# Patient Record
Sex: FEMALE | Race: WHITE | NOT HISPANIC OR LATINO | Employment: FULL TIME | URBAN - METROPOLITAN AREA
[De-identification: names, ages, dates, MRNs, and addresses within clinical notes are randomized per-mention and may not be internally consistent; named-entity substitution may affect disease eponyms.]

---

## 2019-12-18 ENCOUNTER — TRANSFERRED RECORDS (OUTPATIENT)
Dept: HEALTH INFORMATION MANAGEMENT | Facility: CLINIC | Age: 34
End: 2019-12-18

## 2019-12-18 LAB — PAP-ABSTRACT: NORMAL

## 2020-03-25 ENCOUNTER — VIRTUAL VISIT (OUTPATIENT)
Dept: OBGYN | Facility: CLINIC | Age: 35
End: 2020-03-25
Payer: COMMERCIAL

## 2020-03-25 DIAGNOSIS — N94.6 DYSMENORRHEA: Primary | ICD-10-CM

## 2020-03-25 DIAGNOSIS — N94.10 DYSPAREUNIA, FEMALE: ICD-10-CM

## 2020-03-25 PROCEDURE — 99202 OFFICE O/P NEW SF 15 MIN: CPT | Mod: TEL | Performed by: OBSTETRICS & GYNECOLOGY

## 2020-03-25 RX ORDER — COVID-19 ANTIGEN TEST
220 KIT MISCELLANEOUS 2 TIMES DAILY
COMMUNITY
End: 2020-05-21

## 2020-03-25 RX ORDER — TOPIRAMATE 25 MG/1
25 TABLET, FILM COATED ORAL 2 TIMES DAILY
COMMUNITY
Start: 2020-03-06 | End: 2020-04-01

## 2020-03-25 RX ORDER — CHOLECALCIFEROL (VITAMIN D3) 1250 MCG
50000 CAPSULE ORAL PRN
COMMUNITY
Start: 2020-02-26

## 2020-03-25 RX ORDER — PHENTERMINE HYDROCHLORIDE 37.5 MG/1
37.5 TABLET ORAL DAILY
COMMUNITY
Start: 2020-03-06 | End: 2020-05-21

## 2020-03-25 ASSESSMENT — PATIENT HEALTH QUESTIONNAIRE - PHQ9
SUM OF ALL RESPONSES TO PHQ QUESTIONS 1-9: 3
5. POOR APPETITE OR OVEREATING: NOT AT ALL

## 2020-03-25 ASSESSMENT — ANXIETY QUESTIONNAIRES
5. BEING SO RESTLESS THAT IT IS HARD TO SIT STILL: NOT AT ALL
7. FEELING AFRAID AS IF SOMETHING AWFUL MIGHT HAPPEN: NOT AT ALL
6. BECOMING EASILY ANNOYED OR IRRITABLE: NOT AT ALL
1. FEELING NERVOUS, ANXIOUS, OR ON EDGE: SEVERAL DAYS
GAD7 TOTAL SCORE: 2
2. NOT BEING ABLE TO STOP OR CONTROL WORRYING: SEVERAL DAYS
3. WORRYING TOO MUCH ABOUT DIFFERENT THINGS: NOT AT ALL
IF YOU CHECKED OFF ANY PROBLEMS ON THIS QUESTIONNAIRE, HOW DIFFICULT HAVE THESE PROBLEMS MADE IT FOR YOU TO DO YOUR WORK, TAKE CARE OF THINGS AT HOME, OR GET ALONG WITH OTHER PEOPLE: NOT DIFFICULT AT ALL

## 2020-03-25 NOTE — PROGRESS NOTES
"Marifer Jenkins is a 35 year old female who is being evaluated via a billable telephone visit.      The patient has been notified of following:     \"This telephone visit will be conducted via a call between you and your physician/provider. We have found that certain health care needs can be provided without the need for a physical exam.  This service lets us provide the care you need with a short phone conversation.  If a prescription is necessary we can send it directly to your pharmacy.  If lab work is needed we can place an order for that and you can then stop by our lab to have the test done at a later time.    If during the course of the call the physician/provider feels a telephone visit is not appropriate, you will not be charged for this service.\"     Marifer Jenkins complains of    Chief Complaint   Patient presents with     Endometriosi     want to family plan and endo has gotten in the way       I have reviewed and updated the patient's Past Medical History, Social History, Family History and Medication List.    ALLERGIES  Dilaudid [hydromorphone] and Xray dye [contrast dye]    Additional provider notes: The patient is a 35-year-old  1 para 0-0-1-0 who has a progressive history of dysmenorrhea and dyspareunia that is now leading to almost constant pain throughout the cycle.  She has not had an ultrasound or any surgical intervention for diagnosis or treatment of possible endometriosis.  Her primary OB has chosen to put her on a weight loss program of phentermine and Topamax and an IUD (Mirena) was placed.  She is furloughed off of work due to the pandemic at this time.  She is very concerned about the marked increase in pain but understands that the Mirena should stabilize this and hopefully knock down her bleeding to nothing at this time.  Her overall medical history is complicated by weight at the beginning of her program up to 57.  She has lost 10 pounds already.  Her obstetrical history shows " what she feels were a few chemical pregnancies but one true miscarriage after seeing heart tones 2 years ago.  They have not been actively trying recently.  We went through a long discussion of the etiology and possibilities of endometriosis.  She deserves a ultrasound and full examination.  Ultimately she will need a laparoscopy with CO2 laser to make the definitive diagnosis and treatment.  Patient understands the risk and complications of this.  I am not against her staying on her Mirena IUD and have encouraged her to take this time that we have to lose as much weight as possible as it will improve her overall health and the health of her pregnancy.  The patient is very grateful for this discussion that lasted over 20 minutes.    Assessment/Plan: History of progressive dysmenorrhea and dyspareunia.  Rule out endometriosis.  History of obesity with ongoing weight loss program at this time    Phone call duration: 22 minutes    Luis M Nguyen MD

## 2020-03-26 ASSESSMENT — ANXIETY QUESTIONNAIRES: GAD7 TOTAL SCORE: 2

## 2020-04-01 ENCOUNTER — TELEPHONE (OUTPATIENT)
Dept: OBGYN | Facility: CLINIC | Age: 35
End: 2020-04-01

## 2020-04-01 ENCOUNTER — ANCILLARY PROCEDURE (OUTPATIENT)
Dept: ULTRASOUND IMAGING | Facility: CLINIC | Age: 35
End: 2020-04-01
Attending: OBSTETRICS & GYNECOLOGY
Payer: COMMERCIAL

## 2020-04-01 ENCOUNTER — OFFICE VISIT (OUTPATIENT)
Dept: OBGYN | Facility: CLINIC | Age: 35
End: 2020-04-01
Attending: OBSTETRICS & GYNECOLOGY
Payer: COMMERCIAL

## 2020-04-01 ENCOUNTER — PREP FOR PROCEDURE (OUTPATIENT)
Dept: OBGYN | Facility: CLINIC | Age: 35
End: 2020-04-01

## 2020-04-01 VITALS
WEIGHT: 251 LBS | HEIGHT: 64 IN | SYSTOLIC BLOOD PRESSURE: 132 MMHG | BODY MASS INDEX: 42.85 KG/M2 | HEART RATE: 76 BPM | DIASTOLIC BLOOD PRESSURE: 84 MMHG

## 2020-04-01 DIAGNOSIS — N94.6 DYSMENORRHEA: Primary | ICD-10-CM

## 2020-04-01 DIAGNOSIS — N94.6 DYSMENORRHEA: ICD-10-CM

## 2020-04-01 DIAGNOSIS — E66.01 MORBID OBESITY (H): ICD-10-CM

## 2020-04-01 PROCEDURE — 99214 OFFICE O/P EST MOD 30 MIN: CPT | Performed by: OBSTETRICS & GYNECOLOGY

## 2020-04-01 PROCEDURE — 76830 TRANSVAGINAL US NON-OB: CPT | Performed by: OBSTETRICS & GYNECOLOGY

## 2020-04-01 ASSESSMENT — MIFFLIN-ST. JEOR: SCORE: 1823.53

## 2020-04-01 NOTE — Clinical Note
Please abstract the following data from this visit with this patient into the appropriate field in Epic:        Other Tests found in the patient's chart through Chart Review/Care Everywhere:    Pap smear done by this group [facility] this date: 12/18/19    Note to Abstraction: If this section is blank, no results were found via Chart Review/Care Everywhere.

## 2020-04-01 NOTE — PROGRESS NOTES
SUBJECTIVE:                                                   Marifer Jenkins is a 35 year old female who presents to clinic today for the following health issue(s):  Patient presents with:  Ultrasound: pelvic pain        HPI: The patient is seen at this time for ongoing evaluation of severe dysmenorrhea and dyspareunia.  There is been a question of possible endometriosis but she has not had any laparoscopic confirmation.  Ultrasound is performed today.  The patient does have a Mirena IUD in place and she is in the middle of a weight loss program at this time.  Her goal is to lose at least 50 pounds.  The patient has less pain now that she has suppressed but is still debilitated by this.    Patient's last menstrual period was 2020 (exact date)..     Patient is sexually active, .  Using IUD for contraception.    reports that she has never smoked. She has never used smokeless tobacco.    STD testing offered?  Declined    Health maintenance updated:  yes    Today's PHQ-2 Score:   PHQ-2 (  [employer]) 2020   Q1: Little interest or pleasure in doing things 0   Q2: Feeling down, depressed or hopeless 0   PHQ-2 Score 0     Today's PHQ-9 Score:   PHQ-9 SCORE 3/25/2020   PHQ-9 Total Score 3     Today's SHYANNE-7 Score:   SHYANNE-7 SCORE 3/25/2020   Total Score 2       Problem list and histories reviewed & adjusted, as indicated.  Additional history: as documented.    Patient Active Problem List   Diagnosis     Obesity due to excess calories     Past Surgical History:   Procedure Laterality Date     EXC SKIN BENIG >4CM FACE,FACIAL  2011    benign mass from under chin removed     GALLBLADDER SURGERY  10/7/14      Social History     Tobacco Use     Smoking status: Never Smoker     Smokeless tobacco: Never Used   Substance Use Topics     Alcohol use: Yes     Alcohol/week: 0.0 standard drinks     Comment: not while pregnant      Problem (# of Occurrences) Relation (Name,Age of Onset)    Alzheimer Disease (1)  "Maternal Grandfather    Arthritis (1) Maternal Grandmother    Cerebrovascular Disease (1) Maternal Grandfather    Diabetes (1) Paternal Grandmother    Heart Failure (1) Maternal Grandfather    Hyperlipidemia (6) Maternal Grandfather, Paternal Grandmother, Mother, Father, Maternal Grandmother, Paternal Grandfather    No Known Problems (3) Sister, Brother, Other       Negative family history of: Glaucoma, Macular Degeneration            Current Outpatient Medications   Medication Sig     cholecalciferol (VITAMIN D3) 24634 units (1250 mcg) capsule Take 50,000 capsules by mouth as needed Twice a week     levonorgestrel (MIRENA, 52 MG,) 20 MCG/24HR IUD 1 each by Intrauterine route once     naproxen sodium (ALEVE) 220 MG capsule Take 220 mg by mouth 2 times daily prn     phentermine (ADIPEX-P) 37.5 MG tablet Take 37.5 mg by mouth daily     No current facility-administered medications for this visit.      Allergies   Allergen Reactions     Dilaudid [Hydromorphone] Nausea and Vomiting     Xray Dye [Contrast Dye]        ROS:  12 point review of systems negative other than symptoms noted below or in the HPI.  Genitourinary: Pelvic Pain  No urinary frequency or dysuria, bladder or kidney problems      OBJECTIVE:     /84   Pulse 76   Ht 1.626 m (5' 4\")   Wt 113.9 kg (251 lb)   LMP 03/05/2020 (Exact Date)   BMI 43.08 kg/m    Body mass index is 43.08 kg/m .    Exam:  Constitutional:  Appearance: Well nourished, well developed alert, in no acute distress  Gastrointestinal:  Abdominal Examination:  Abdomen nontender to palpation, tone normal without rigidity or guarding, no masses present, umbilicus without lesions; Liver/Spleen:  No hepatomegaly present, liver nontender to palpation; Hernias:  No hernias present  Lymphatic: Lymph Nodes:  No other lymphadenopathy present  Skin: General Inspection:  No rashes present, no lesions present, no areas of discoloration.  Neurologic:  Mental Status:  Oriented X3.  Normal " strength and tone, sensory exam grossly normal, mentation intact and speech normal.    Psychiatric:  Mentation appears normal and affect normal/bright.  Pelvic Exam:  External Genitalia:     Normal appearance for age, no discharge present, no tenderness present, no inflammatory lesions present, color normal  Vagina:     Normal vaginal vault without central or paravaginal defects, no discharge present, no inflammatory lesions present, no masses present  Bladder:     Nontender to palpation  Urethra:   Urethral Body:  Urethra palpation normal, urethra structural support normal   Urethral Meatus:  No erythema or lesions present  Cervix:     Appearance healthy, no lesions present, nontender to palpation, no bleeding present  Uterus:     Uterus: firm, normal sized and tender high on left, midplane in position.   Adnexa:     Left adnexal tenderness present, no adnexal masses present  Perineum:     Perineum within normal limits, no evidence of trauma, no rashes or skin lesions present  Anus:     Anus within normal limits, no hemorrhoids present  Inguinal Lymph Nodes:     No lymphadenopathy present  Pubic Hair:     Normal pubic hair distribution for age  Genitalia and Groin:     No rashes present, no lesions present, no areas of discoloration, no masses present       In-Clinic Test Results:  Results for orders placed or performed in visit on 04/01/20   US Pelvic Complete w Transvaginal     Status: None    Narrative    US Pelvic Complete w Transvaginal   Order #: [id number] Accession #: [id number]   Study Notes?      Lizzette Villela on 4/1/2020 ? 1:34 PM    ?   Gynecological Ultrasound Report  Pelvic U/S - Transvaginal  [facility]  Referring Provider: Luis M Nguyen MD  Sonographer:  Lizzette Villela RDMS  Indication: Pain- Dysmenorrhea  LMP: Patient's last menstrual period was 03/05/2020 (exact date).  History:   Gynecological Ultrasonography:   Uterus: anteverted and mid-position. Contour is  smooth/regular.  Size: 8.78 x 4.62 x 3.66 cm  Endometrium: Thickness Total 6.57 mm  Findings: IUD normal placement  Right Ovary: 3.84 x 2.29 x 2.47 cm. Wnl  Left Ovary: 2.57 x 2.87 x 1.80 cm. Wnl  Cul de Sac Free Fluid: No free fluid  ?   Impression:   ?   SONOGRAPHER NOTES TO READING PROVIDER:   No free fluid- no adhesions   visualized- iud normal placement  ?   ?            ASSESSMENT/PLAN:                                                        35-year-old patient with progressive history of dysmenorrhea and dyspareunia.  This is debilitating.  She has a relatively normal-looking ultrasound with a normal placement of her IUD.  Her ovaries are normal size with no cystic enlargement.  There is no free fluid.  There are no fibroids noted.  Examination shows a small firm uterus that is nontender.  She does have tenderness high on the left fornix and adnexa.  The right side is negative.  We have discussed the possibility of definitive diagnosis to rule out and treat endometriosis.  We gave her an ACOG brochure for laparoscopy.  The risks and complications of the procedure have been reviewed and accepted.  This would necessitate a short general anesthetic and would allow us to discern the etiology of her ongoing pelvic pain and dysmenorrhea.  The patient is trying very hard at this time to lose weight and is on a pharmacologic protocol.      Luis M Nguyen MD  [facility]

## 2020-04-30 PROBLEM — N94.6 DYSMENORRHEA: Status: ACTIVE | Noted: 2020-04-30

## 2020-05-01 NOTE — TELEPHONE ENCOUNTER
Type of surgery: LSC C02 LASER  Location of surgery: [facility] OR  Date and time of surgery: 5/7/2020 11:35a  Surgeon: ROGER  Pre-Op Appt Date: HOSPITAL  Post-Op Appt Date: TBD   Packet sent out: MAILED 4/30/2020  Pre-cert/Authorization completed:  TBD  Date: 4/30/2020 Abel jaimes/Kathie Mayes  Surgery Scheduler

## 2020-05-03 DIAGNOSIS — Z20.822 ENCOUNTER FOR LABORATORY TESTING FOR COVID-19 VIRUS: Primary | ICD-10-CM

## 2020-05-06 ENCOUNTER — OFFICE VISIT (OUTPATIENT)
Dept: URGENT CARE | Facility: URGENT CARE | Age: 35
End: 2020-05-06
Attending: OBSTETRICS & GYNECOLOGY
Payer: COMMERCIAL

## 2020-05-06 DIAGNOSIS — Z20.822 ENCOUNTER FOR LABORATORY TESTING FOR COVID-19 VIRUS: ICD-10-CM

## 2020-05-06 PROCEDURE — 99000 SPECIMEN HANDLING OFFICE-LAB: CPT

## 2020-05-06 PROCEDURE — U0003 INFECTIOUS AGENT DETECTION BY NUCLEIC ACID (DNA OR RNA); SEVERE ACUTE RESPIRATORY SYNDROME CORONAVIRUS 2 (SARS-COV-2) (CORONAVIRUS DISEASE [COVID-19]), AMPLIFIED PROBE TECHNIQUE, MAKING USE OF HIGH THROUGHPUT TECHNOLOGIES AS DESCRIBED BY CMS-2020-01-R: HCPCS | Mod: 90

## 2020-05-06 RX ORDER — PHENAZOPYRIDINE HYDROCHLORIDE 200 MG/1
200 TABLET, FILM COATED ORAL ONCE
Status: CANCELLED | OUTPATIENT
Start: 2020-05-06 | End: 2020-05-06

## 2020-05-06 NOTE — TELEPHONE ENCOUNTER
Patient calling for packet details as she never rec'd packet. Gave her important info, as well as codes for insurance. Notified her she will get a pre-op nurse calling as well. She is scheduled for her covid testing today at 2:10 which was first available when she was called on Monday.

## 2020-05-06 NOTE — H&P
Other Tests found in the patient's chart through Chart Review/Care Everywhere:     Pap smear done by this group Margie this date: 19     Note to Abstraction: If this section is blank, no results were found via Chart Review/Care Everywhere.               Progress Notes   Luis M Nguyen MD (Physician)     OB/Gyn         SUBJECTIVE:                                                   Marifer Jenknis is a 35 year old female who presents to clinic today for the following health issue(s):  Patient presents with:  Ultrasound: pelvic pain          HPI: The patient is seen at this time for ongoing evaluation of severe dysmenorrhea and dyspareunia.  There is been a question of possible endometriosis but she has not had any laparoscopic confirmation.  Ultrasound is performed today.  The patient does have a Mirena IUD in place and she is in the middle of a weight loss program at this time.  Her goal is to lose at least 50 pounds.  The patient has less pain now that she has suppressed but is still debilitated by this.     Patient's last menstrual period was 2020 (exact date)..      Patient is sexually active, .  Using IUD for contraception.    reports that she has never smoked. She has never used smokeless tobacco.     STD testing offered?  Declined     Health maintenance updated:  yes     Today's PHQ-2 Score:   PHQ-2 (  [employer]) 2020   Q1: Little interest or pleasure in doing things 0   Q2: Feeling down, depressed or hopeless 0   PHQ-2 Score 0     Today's PHQ-9 Score:   PHQ-9 SCORE 3/25/2020   PHQ-9 Total Score 3     Today's SHYANNE-7 Score:   SHYANNE-7 SCORE 3/25/2020   Total Score 2        Problem list and histories reviewed & adjusted, as indicated.  Additional history: as documented.         Patient Active Problem List   Diagnosis     Obesity due to excess calories             Past Surgical History:   Procedure Laterality Date     EXC SKIN BENIG >4CM FACE,FACIAL   2011     benign mass from  "under chin removed     GALLBLADDER SURGERY   10/7/14      Social History            Tobacco Use     Smoking status: Never Smoker     Smokeless tobacco: Never Used   Substance Use Topics     Alcohol use: Yes       Alcohol/week: 0.0 standard drinks       Comment: not while pregnant      Problem (# of Occurrences) Relation (Name,Age of Onset)     Alzheimer Disease (1) Maternal Grandfather     Arthritis (1) Maternal Grandmother     Cerebrovascular Disease (1) Maternal Grandfather     Diabetes (1) Paternal Grandmother     Heart Failure (1) Maternal Grandfather     Hyperlipidemia (6) Maternal Grandfather, Paternal Grandmother, Mother, Father, Maternal Grandmother, Paternal Grandfather     No Known Problems (3) Sister, Brother, Other             Negative family history of: Glaucoma, Macular Degeneration              Current Outpatient Medications   Medication Sig     cholecalciferol (VITAMIN D3) 05024 units (1250 mcg) capsule Take 50,000 capsules by mouth as needed Twice a week     levonorgestrel (MIRENA, 52 MG,) 20 MCG/24HR IUD 1 each by Intrauterine route once     naproxen sodium (ALEVE) 220 MG capsule Take 220 mg by mouth 2 times daily prn     phentermine (ADIPEX-P) 37.5 MG tablet Take 37.5 mg by mouth daily      No current facility-administered medications for this visit.           Allergies   Allergen Reactions     Dilaudid [Hydromorphone] Nausea and Vomiting     Xray Dye [Contrast Dye]          ROS:  12 point review of systems negative other than symptoms noted below or in the HPI.  Genitourinary: Pelvic Pain  No urinary frequency or dysuria, bladder or kidney problems        OBJECTIVE:      /84   Pulse 76   Ht 1.626 m (5' 4\")   Wt 113.9 kg (251 lb)   LMP 03/05/2020 (Exact Date)   BMI 43.08 kg/m    Body mass index is 43.08 kg/m .     Exam:  Constitutional:  Appearance: Well nourished, well developed alert, in no acute distress  Gastrointestinal:  Abdominal Examination:  Abdomen nontender to palpation, tone " normal without rigidity or guarding, no masses present, umbilicus without lesions; Liver/Spleen:  No hepatomegaly present, liver nontender to palpation; Hernias:  No hernias present  Lymphatic: Lymph Nodes:  No other lymphadenopathy present  Skin: General Inspection:  No rashes present, no lesions present, no areas of discoloration.  Neurologic:  Mental Status:  Oriented X3.  Normal strength and tone, sensory exam grossly normal, mentation intact and speech normal.    Psychiatric:  Mentation appears normal and affect normal/bright.  Pelvic Exam:  External Genitalia:                Normal appearance for age, no discharge present, no tenderness present, no inflammatory lesions present, color normal  Vagina:                Normal vaginal vault without central or paravaginal defects, no discharge present, no inflammatory lesions present, no masses present  Bladder:                Nontender to palpation  Urethra:              Urethral Body:  Urethra palpation normal, urethra structural support normal              Urethral Meatus:  No erythema or lesions present  Cervix:                Appearance healthy, no lesions present, nontender to palpation, no bleeding present  Uterus:                Uterus: firm, normal sized and tender high on left, midplane in position.   Adnexa:                Left adnexal tenderness present, no adnexal masses present  Perineum:                Perineum within normal limits, no evidence of trauma, no rashes or skin lesions present  Anus:                Anus within normal limits, no hemorrhoids present  Inguinal Lymph Nodes:                No lymphadenopathy present  Pubic Hair:                Normal pubic hair distribution for age  Genitalia and Groin:                No rashes present, no lesions present, no areas of discoloration, no masses present        In-Clinic Test Results:      Results for orders placed or performed in visit on 04/01/20   US Pelvic Complete w Transvaginal     Status:  None     Narrative     US Pelvic Complete w Transvaginal   Order #: [id number] Accession #: [id number]   Study Notes?      Lizzette Villela on 4/1/2020 ? 1:34 PM    ?   Gynecological Ultrasound Report  [facility]  Referring Provider: Luis M Nguyen MD  Sonographer:  Lizzette Villela RDMS  Indication: Pain- Dysmenorrhea  LMP: Patient's last menstrual period was 03/05/2020 (exact date).  History:   Gynecological Ultrasonography:   Uterus: anteverted and mid-position. Contour is smooth/regular.  Size: 8.78 x 4.62 x 3.66 cm  Endometrium: Thickness Total 6.57 mm  Findings: IUD normal placement  Right Ovary: 3.84 x 2.29 x 2.47 cm. Wnl  Left Ovary: 2.57 x 2.87 x 1.80 cm. Wnl  Cul de Sac Free Fluid: No free fluid  ?   Impression:   ?   SONOGRAPHER NOTES TO READING PROVIDER:   No free fluid- no adhesions   visualized- iud normal placement  ?   ?             ASSESSMENT/PLAN:                                                          35-year-old patient with progressive history of dysmenorrhea and dyspareunia.  This is debilitating.  She has a relatively normal-looking ultrasound with a normal placement of her IUD.  Her ovaries are normal size with no cystic enlargement.  There is no free fluid.  There are no fibroids noted.  Examination shows a small firm uterus that is nontender.  She does have tenderness high on the left fornix and adnexa.  The right side is negative.  We have discussed the possibility of definitive diagnosis to rule out and treat endometriosis.  We gave her an ACOG brochure for laparoscopy.  The risks and complications of the procedure have been reviewed and accepted.  This would necessitate a short general anesthetic and would allow us to discern the etiology of her ongoing pelvic pain and dysmenorrhea.  The patient is trying very hard at this time to lose weight and is on a pharmacologic protocol.        Luis M Nguyen MD  [facility]  "[facility]      Instructions      After Visit Summary (Automatic SnapShot taken 4/1/2020)   Additional Documentation     Vitals:     /84    Pulse 76    Ht 1.626 m (5' 4\")    Wt 113.9 kg (251 lb)    LMP 03/05/2020 (Exact Date)    BMI 43.08 kg/m     BSA 2.27 m        More Vitals    Flowsheets:     Ambulatory Assessments,    NICU VS,    Anthropometrics,    Vitals Reassessment       Encounter Info:     Billing Info,    History,    Allergies,    Detailed Report       Communications         Chart Routed to [employer]   Sent by Indu Bunch MA   AVS Reports     Date/Time  Report  Action  User    4/1/2020  2:13 PM  After Visit Summary  Automatically Generated  Lius M Nguyen MD    Encounter Information      Provider  Department  Encounter #  Center    4/1/2020 2:00 PM  Luis M Nguyen MD   Ob/Gyn  [id number]  [facility]    Reviewed this Encounter      Medications  Problems  Allergies  History    Indu Bunch MA    Reviewed  ADL, Alcohol, Drug Use, Family, Medical, Sexual Activity, Surgical, Tobacco    Orders Placed      None   Medication Changes        None      Medication List     Visit Diagnoses         Dysmenorrhea      Morbid obesity (H)      Problem List         "

## 2020-05-07 ENCOUNTER — ANESTHESIA (OUTPATIENT)
Dept: SURGERY | Facility: CLINIC | Age: 35
End: 2020-05-07
Payer: COMMERCIAL

## 2020-05-07 ENCOUNTER — SURGERY (OUTPATIENT)
Age: 35
End: 2020-05-07
Payer: COMMERCIAL

## 2020-05-07 ENCOUNTER — ANESTHESIA EVENT (OUTPATIENT)
Dept: SURGERY | Facility: CLINIC | Age: 35
End: 2020-05-07
Payer: COMMERCIAL

## 2020-05-07 ENCOUNTER — HOSPITAL ENCOUNTER (OUTPATIENT)
Facility: CLINIC | Age: 35
Discharge: HOME | End: 2020-05-07
Attending: OBSTETRICS & GYNECOLOGY | Admitting: OBSTETRICS & GYNECOLOGY
Payer: COMMERCIAL

## 2020-05-07 VITALS
DIASTOLIC BLOOD PRESSURE: 79 MMHG | SYSTOLIC BLOOD PRESSURE: 119 MMHG | HEIGHT: 65 IN | OXYGEN SATURATION: 100 % | BODY MASS INDEX: 41.1 KG/M2 | WEIGHT: 246.7 LBS | RESPIRATION RATE: 16 BRPM | TEMPERATURE: 96.8 F | HEART RATE: 74 BPM

## 2020-05-07 DIAGNOSIS — N94.6 DYSMENORRHEA: ICD-10-CM

## 2020-05-07 LAB
ABO + RH BLD: NORMAL
ABO + RH BLD: NORMAL
B-HCG SERPL-ACNC: <1 IU/L (ref 0–5)
BLD GP AB SCN SERPL QL: NORMAL
BLOOD BANK CMNT PATIENT-IMP: NORMAL
SARS-COV-2 RNA SPEC QL NAA+PROBE: NOT DETECTED
SPECIMEN EXP DATE BLD: NORMAL
SPECIMEN SOURCE: NORMAL

## 2020-05-07 PROCEDURE — 37000009 ZZH ANESTHESIA TECHNICAL FEE, EACH ADDTL 15 MIN: Performed by: OBSTETRICS & GYNECOLOGY

## 2020-05-07 PROCEDURE — 25800030 ZZH RX IP 258 OP 636: Performed by: NURSE ANESTHETIST, CERTIFIED REGISTERED

## 2020-05-07 PROCEDURE — 36000063 ZZH SURGERY LEVEL 4 EA 15 ADDTL MIN: Performed by: OBSTETRICS & GYNECOLOGY

## 2020-05-07 PROCEDURE — 25800030 ZZH RX IP 258 OP 636: Performed by: OBSTETRICS & GYNECOLOGY

## 2020-05-07 PROCEDURE — 25800030 ZZH RX IP 258 OP 636: Performed by: ANESTHESIOLOGY

## 2020-05-07 PROCEDURE — 25000125 ZZHC RX 250: Performed by: OBSTETRICS & GYNECOLOGY

## 2020-05-07 PROCEDURE — 40000170 ZZH STATISTIC PRE-PROCEDURE ASSESSMENT II: Performed by: OBSTETRICS & GYNECOLOGY

## 2020-05-07 PROCEDURE — 25000125 ZZHC RX 250: Performed by: ANESTHESIOLOGY

## 2020-05-07 PROCEDURE — 25000128 H RX IP 250 OP 636: Performed by: OBSTETRICS & GYNECOLOGY

## 2020-05-07 PROCEDURE — 86900 BLOOD TYPING SEROLOGIC ABO: CPT | Performed by: OBSTETRICS & GYNECOLOGY

## 2020-05-07 PROCEDURE — 27210794 ZZH OR GENERAL SUPPLY STERILE: Performed by: OBSTETRICS & GYNECOLOGY

## 2020-05-07 PROCEDURE — 86850 RBC ANTIBODY SCREEN: CPT | Performed by: OBSTETRICS & GYNECOLOGY

## 2020-05-07 PROCEDURE — 25000132 ZZH RX MED GY IP 250 OP 250 PS 637: Performed by: OBSTETRICS & GYNECOLOGY

## 2020-05-07 PROCEDURE — 37000008 ZZH ANESTHESIA TECHNICAL FEE, 1ST 30 MIN: Performed by: OBSTETRICS & GYNECOLOGY

## 2020-05-07 PROCEDURE — 58662 LAPAROSCOPY EXCISE LESIONS: CPT | Performed by: OBSTETRICS & GYNECOLOGY

## 2020-05-07 PROCEDURE — 86901 BLOOD TYPING SEROLOGIC RH(D): CPT | Performed by: OBSTETRICS & GYNECOLOGY

## 2020-05-07 PROCEDURE — 58301 REMOVE INTRAUTERINE DEVICE: CPT | Mod: 51 | Performed by: OBSTETRICS & GYNECOLOGY

## 2020-05-07 PROCEDURE — 25000128 H RX IP 250 OP 636: Performed by: NURSE ANESTHETIST, CERTIFIED REGISTERED

## 2020-05-07 PROCEDURE — 25000125 ZZHC RX 250: Performed by: NURSE ANESTHETIST, CERTIFIED REGISTERED

## 2020-05-07 PROCEDURE — 36000093 ZZH SURGERY LEVEL 4 1ST 30 MIN: Performed by: OBSTETRICS & GYNECOLOGY

## 2020-05-07 PROCEDURE — 71000012 ZZH RECOVERY PHASE 1 LEVEL 1 FIRST HR: Performed by: OBSTETRICS & GYNECOLOGY

## 2020-05-07 PROCEDURE — 71000027 ZZH RECOVERY PHASE 2 EACH 15 MINS: Performed by: OBSTETRICS & GYNECOLOGY

## 2020-05-07 PROCEDURE — 36415 COLL VENOUS BLD VENIPUNCTURE: CPT | Performed by: OBSTETRICS & GYNECOLOGY

## 2020-05-07 PROCEDURE — 84702 CHORIONIC GONADOTROPIN TEST: CPT | Performed by: OBSTETRICS & GYNECOLOGY

## 2020-05-07 RX ORDER — FENTANYL CITRATE 50 UG/ML
25-50 INJECTION, SOLUTION INTRAMUSCULAR; INTRAVENOUS
Status: DISCONTINUED | OUTPATIENT
Start: 2020-05-07 | End: 2020-05-07 | Stop reason: HOSPADM

## 2020-05-07 RX ORDER — DEXAMETHASONE SODIUM PHOSPHATE 4 MG/ML
INJECTION, SOLUTION INTRA-ARTICULAR; INTRALESIONAL; INTRAMUSCULAR; INTRAVENOUS; SOFT TISSUE PRN
Status: DISCONTINUED | OUTPATIENT
Start: 2020-05-07 | End: 2020-05-07

## 2020-05-07 RX ORDER — HYDROMORPHONE HYDROCHLORIDE 1 MG/ML
.3-.5 INJECTION, SOLUTION INTRAMUSCULAR; INTRAVENOUS; SUBCUTANEOUS EVERY 10 MIN PRN
Status: DISCONTINUED | OUTPATIENT
Start: 2020-05-07 | End: 2020-05-07 | Stop reason: HOSPADM

## 2020-05-07 RX ORDER — MEPERIDINE HYDROCHLORIDE 25 MG/ML
12.5 INJECTION INTRAMUSCULAR; INTRAVENOUS; SUBCUTANEOUS
Status: DISCONTINUED | OUTPATIENT
Start: 2020-05-07 | End: 2020-05-07 | Stop reason: HOSPADM

## 2020-05-07 RX ORDER — ONDANSETRON 2 MG/ML
INJECTION INTRAMUSCULAR; INTRAVENOUS PRN
Status: DISCONTINUED | OUTPATIENT
Start: 2020-05-07 | End: 2020-05-07

## 2020-05-07 RX ORDER — OXYCODONE AND ACETAMINOPHEN 5; 325 MG/1; MG/1
1-2 TABLET ORAL EVERY 4 HOURS PRN
Qty: 15 TABLET | Refills: 0 | Status: SHIPPED | OUTPATIENT
Start: 2020-05-07 | End: 2020-05-21

## 2020-05-07 RX ORDER — LIDOCAINE HYDROCHLORIDE 20 MG/ML
INJECTION, SOLUTION INFILTRATION; PERINEURAL PRN
Status: DISCONTINUED | OUTPATIENT
Start: 2020-05-07 | End: 2020-05-07

## 2020-05-07 RX ORDER — PROPOFOL 10 MG/ML
INJECTION, EMULSION INTRAVENOUS CONTINUOUS PRN
Status: DISCONTINUED | OUTPATIENT
Start: 2020-05-07 | End: 2020-05-07

## 2020-05-07 RX ORDER — NALOXONE HYDROCHLORIDE 0.4 MG/ML
.1-.4 INJECTION, SOLUTION INTRAMUSCULAR; INTRAVENOUS; SUBCUTANEOUS
Status: DISCONTINUED | OUTPATIENT
Start: 2020-05-07 | End: 2020-05-07 | Stop reason: HOSPADM

## 2020-05-07 RX ORDER — BUPIVACAINE HYDROCHLORIDE 2.5 MG/ML
INJECTION, SOLUTION INFILTRATION; PERINEURAL PRN
Status: DISCONTINUED | OUTPATIENT
Start: 2020-05-07 | End: 2020-05-07 | Stop reason: HOSPADM

## 2020-05-07 RX ORDER — ONDANSETRON 4 MG/1
4 TABLET, ORALLY DISINTEGRATING ORAL EVERY 30 MIN PRN
Status: DISCONTINUED | OUTPATIENT
Start: 2020-05-07 | End: 2020-05-07 | Stop reason: HOSPADM

## 2020-05-07 RX ORDER — KETOROLAC TROMETHAMINE 30 MG/ML
INJECTION, SOLUTION INTRAMUSCULAR; INTRAVENOUS PRN
Status: DISCONTINUED | OUTPATIENT
Start: 2020-05-07 | End: 2020-05-07

## 2020-05-07 RX ORDER — SODIUM CHLORIDE, SODIUM LACTATE, POTASSIUM CHLORIDE, CALCIUM CHLORIDE 600; 310; 30; 20 MG/100ML; MG/100ML; MG/100ML; MG/100ML
INJECTION, SOLUTION INTRAVENOUS CONTINUOUS
Status: DISCONTINUED | OUTPATIENT
Start: 2020-05-07 | End: 2020-05-07 | Stop reason: HOSPADM

## 2020-05-07 RX ORDER — VECURONIUM BROMIDE 1 MG/ML
INJECTION, POWDER, LYOPHILIZED, FOR SOLUTION INTRAVENOUS PRN
Status: DISCONTINUED | OUTPATIENT
Start: 2020-05-07 | End: 2020-05-07

## 2020-05-07 RX ORDER — OXYCODONE HYDROCHLORIDE 5 MG/1
5 TABLET ORAL EVERY 4 HOURS PRN
Status: DISCONTINUED | OUTPATIENT
Start: 2020-05-07 | End: 2020-05-07 | Stop reason: HOSPADM

## 2020-05-07 RX ORDER — MAGNESIUM HYDROXIDE 1200 MG/15ML
LIQUID ORAL PRN
Status: DISCONTINUED | OUTPATIENT
Start: 2020-05-07 | End: 2020-05-07 | Stop reason: HOSPADM

## 2020-05-07 RX ORDER — SCOLOPAMINE TRANSDERMAL SYSTEM 1 MG/1
1 PATCH, EXTENDED RELEASE TRANSDERMAL ONCE
Status: DISCONTINUED | OUTPATIENT
Start: 2020-05-07 | End: 2020-05-07 | Stop reason: HOSPADM

## 2020-05-07 RX ORDER — PROPOFOL 10 MG/ML
INJECTION, EMULSION INTRAVENOUS PRN
Status: DISCONTINUED | OUTPATIENT
Start: 2020-05-07 | End: 2020-05-07

## 2020-05-07 RX ORDER — ONDANSETRON 2 MG/ML
4 INJECTION INTRAMUSCULAR; INTRAVENOUS EVERY 30 MIN PRN
Status: DISCONTINUED | OUTPATIENT
Start: 2020-05-07 | End: 2020-05-07 | Stop reason: HOSPADM

## 2020-05-07 RX ORDER — OXYCODONE AND ACETAMINOPHEN 5; 325 MG/1; MG/1
1 TABLET ORAL
Status: COMPLETED | OUTPATIENT
Start: 2020-05-07 | End: 2020-05-07

## 2020-05-07 RX ORDER — FENTANYL CITRATE 50 UG/ML
INJECTION, SOLUTION INTRAMUSCULAR; INTRAVENOUS PRN
Status: DISCONTINUED | OUTPATIENT
Start: 2020-05-07 | End: 2020-05-07

## 2020-05-07 RX ADMIN — SODIUM CHLORIDE, POTASSIUM CHLORIDE, SODIUM LACTATE AND CALCIUM CHLORIDE: 600; 310; 30; 20 INJECTION, SOLUTION INTRAVENOUS at 13:02

## 2020-05-07 RX ADMIN — VECURONIUM BROMIDE 1 MG: 1 INJECTION, POWDER, LYOPHILIZED, FOR SOLUTION INTRAVENOUS at 12:14

## 2020-05-07 RX ADMIN — FENTANYL CITRATE 50 MCG: 50 INJECTION, SOLUTION INTRAMUSCULAR; INTRAVENOUS at 12:25

## 2020-05-07 RX ADMIN — VECURONIUM BROMIDE 1 MG: 1 INJECTION, POWDER, LYOPHILIZED, FOR SOLUTION INTRAVENOUS at 12:10

## 2020-05-07 RX ADMIN — BUPIVACAINE HYDROCHLORIDE 11 ML: 2.5 INJECTION, SOLUTION EPIDURAL; INFILTRATION; INTRACAUDAL; PERINEURAL at 12:41

## 2020-05-07 RX ADMIN — ONDANSETRON 4 MG: 2 INJECTION INTRAMUSCULAR; INTRAVENOUS at 11:49

## 2020-05-07 RX ADMIN — MIDAZOLAM 2 MG: 1 INJECTION INTRAMUSCULAR; INTRAVENOUS at 11:35

## 2020-05-07 RX ADMIN — OXYCODONE HYDROCHLORIDE AND ACETAMINOPHEN 1 TABLET: 5; 325 TABLET ORAL at 13:46

## 2020-05-07 RX ADMIN — ONDANSETRON 4 MG: 2 INJECTION INTRAMUSCULAR; INTRAVENOUS at 12:33

## 2020-05-07 RX ADMIN — SODIUM CHLORIDE, POTASSIUM CHLORIDE, SODIUM LACTATE AND CALCIUM CHLORIDE: 600; 310; 30; 20 INJECTION, SOLUTION INTRAVENOUS at 11:48

## 2020-05-07 RX ADMIN — DEXAMETHASONE SODIUM PHOSPHATE 4 MG: 4 INJECTION, SOLUTION INTRA-ARTICULAR; INTRALESIONAL; INTRAMUSCULAR; INTRAVENOUS; SOFT TISSUE at 11:49

## 2020-05-07 RX ADMIN — SUGAMMADEX 200 MG: 100 INJECTION, SOLUTION INTRAVENOUS at 12:45

## 2020-05-07 RX ADMIN — SODIUM CHLORIDE 1000 ML: 900 IRRIGANT IRRIGATION at 12:13

## 2020-05-07 RX ADMIN — PROPOFOL 200 MCG/KG/MIN: 10 INJECTION, EMULSION INTRAVENOUS at 11:40

## 2020-05-07 RX ADMIN — LIDOCAINE HYDROCHLORIDE 100 MG: 20 INJECTION, SOLUTION INFILTRATION; PERINEURAL at 11:40

## 2020-05-07 RX ADMIN — ROCURONIUM BROMIDE 50 MG: 10 INJECTION INTRAVENOUS at 11:40

## 2020-05-07 RX ADMIN — KETOROLAC TROMETHAMINE 30 MG: 30 INJECTION, SOLUTION INTRAMUSCULAR at 12:33

## 2020-05-07 RX ADMIN — SCOPALAMINE 1 PATCH: 1 PATCH, EXTENDED RELEASE TRANSDERMAL at 10:54

## 2020-05-07 RX ADMIN — HEPARIN SODIUM 1005 ML: 1000 INJECTION, SOLUTION INTRAVENOUS; SUBCUTANEOUS at 12:13

## 2020-05-07 RX ADMIN — PHENYLEPHRINE HYDROCHLORIDE 100 MCG: 10 INJECTION INTRAVENOUS at 12:16

## 2020-05-07 RX ADMIN — PHENYLEPHRINE HYDROCHLORIDE 100 MCG: 10 INJECTION INTRAVENOUS at 12:05

## 2020-05-07 RX ADMIN — FENTANYL CITRATE 50 MCG: 50 INJECTION, SOLUTION INTRAMUSCULAR; INTRAVENOUS at 11:40

## 2020-05-07 RX ADMIN — FENTANYL CITRATE 50 MCG: 50 INJECTION, SOLUTION INTRAMUSCULAR; INTRAVENOUS at 11:35

## 2020-05-07 RX ADMIN — PROPOFOL 200 MG: 10 INJECTION, EMULSION INTRAVENOUS at 11:40

## 2020-05-07 ASSESSMENT — MIFFLIN-ST. JEOR: SCORE: 1819.9

## 2020-05-07 ASSESSMENT — LIFESTYLE VARIABLES: TOBACCO_USE: 0

## 2020-05-07 NOTE — ANESTHESIA PREPROCEDURE EVALUATION
"Anesthesia Pre-Procedure Evaluation    Patient: Marifer Jenkins   MRN: [id number] : 1985          Preoperative Diagnosis: Dysmenorrhea [N94.6]    Procedure(s):  LAPAROSCOPY, USING CO2 LASER    Past Medical History:   Diagnosis Date     Obesity      Pelvic pain      Past Surgical History:   Procedure Laterality Date     CHOLECYSTECTOMY       EXC SKIN BENIG >4CM FACE,FACIAL  2011    benign mass from under chin removed     GALLBLADDER SURGERY  10/7/14       Anesthesia Evaluation     . Pt has had prior anesthetic. Type: General    History of anesthetic complications   - PONV        ROS/MED HX    ENT/Pulmonary:      (-) tobacco use and asthma   Neurologic:       Cardiovascular:  - neg cardiovascular ROS       METS/Exercise Tolerance:     Hematologic:         Musculoskeletal:         GI/Hepatic:         Renal/Genitourinary:         Endo:     (+) Obesity, .   (-) Type II DM and thyroid disease   Psychiatric:         Infectious Disease:         Malignancy:         Other:                          Physical Exam  Normal systems: dental    Airway   Mallampati: II  TM distance: >3 FB  Neck ROM: full    Dental     Cardiovascular   Rhythm and rate: regular      Pulmonary             Lab Results   Component Value Date    HCG Positive (A) 06/15/2016       Preop Vitals  BP Readings from Last 3 Encounters:   20 132/80   20 132/84   16 118/74    Pulse Readings from Last 3 Encounters:   20 76   12 90   12 106      Resp Readings from Last 3 Encounters:   20 16    SpO2 Readings from Last 3 Encounters:   20 99%   12 100%   12 99%      Temp Readings from Last 1 Encounters:   20 36.9  C (98.4  F) (Temporal)    Ht Readings from Last 1 Encounters:   20 1.651 m (5' 5\")      Wt Readings from Last 1 Encounters:   20 111.9 kg (246 lb 11.2 oz)    Estimated body mass index is 41.05 kg/m  as calculated from the following:    Height as of this encounter: 1.651 m " "(5' 5\").    Weight as of this encounter: 111.9 kg (246 lb 11.2 oz).       Anesthesia Plan      History & Physical Review  History and physical reviewed and following examination; no interval change.    ASA Status:  2 .    NPO Status:  > 8 hours    Plan for General with Propofol induction. Maintenance will be TIVA.    PONV prophylaxis:  Ondansetron (or other 5HT-3), Dexamethasone or Solumedrol and Scopolamine patch         Postoperative Care  Postoperative pain management:  IV analgesics and Oral pain medications.      Consents  Anesthetic plan, risks, benefits and alternatives discussed with:  Patient..                 Zaheer Zafar MD  "

## 2020-05-07 NOTE — OP NOTE
Procedure Date: 05/07/2020      REASON FOR ADMISSION:  Progressive dysmenorrhea.      POSTOPERATIVE DIAGNOSIS:  Stage II endometriosis.      OPERATIVE PROCEDURES:  Examination under anesthesia, removal of Mirena intrauterine device, diagnostic laparoscopy, laser lysis of adhesion, laser vaporization of endometriosis.      OPERATIVE FINDINGS:  Her IUD was malpositioned with the tip at the cervical os, and this was removed as it is ineffective.  Intra-abdominally, the patient had anterior and posterior cul-de-sac surface and deep endometriosis.  There was a corpus luteum cyst on the mid plane of the right ovary.  There were 2 implants of atypical endometriosis on the left fallopian tube.  There was a small implant of endometriosis at the insertion of the right round ligament to the inguinal canal that was vaporized away.  There were sidewall lesions high above the round ligament on the left with some perisigmoid adhesions to these.  All of these areas were completely vaporized all the way to normal retroperitoneal fat.      OPERATIVE PROCEDURE IN DETAIL:  After general anesthesia was induced, the patient was placed in the dorsal lithotomy position and prepped and draped in the usual fashion.  A Flood catheter was placed.  A speculum was placed, and the tip of the lower pole of her IUD was at the cervical os.  This was removed as it is malpositioned.  A tenaculum was placed.      Through a subumbilical incision, the Veress needle was placed and 3 liters of CO2 insufflated.  The laparoscope, trocar and sheath were placed without incident.  A 5 mm left lower quadrant trocar was placed under direct vision.  The above findings were noted.  The laser scope was brought in and the perisigmoid adhesions were taken down.  All anterior and posterior cul-de-sac and sidewall peritoneal lesions were vaporized all the way to normal retroperitoneal fat.  There was deep endometriosis on both uterosacral ligaments.  The surface disease  on the ovaries and tubes was easily vaporized away.  Both fallopian tubes looked completely normal.  There was an implant of endometriosis at the insertion of the right round ligament to the right inguinal canal that was vaporized away.  There were a few periappendiceal adhesions at the pelvic brim that were taken down, but there was no disease on the appendix.  Copious irrigation was undertaken.  At this time, the decision was made to back out.  All gas was desufflated.  The incisions were closed with 4-0 Vicryl and Steri-Strips.  The Flood catheter was removed, as well as the uterine manipulator.  The patient was given Toradol.        She will be discharged to home and given Percocet as needed for pain.  She will return to the office in 2 weeks for a postoperative check.         LEONA DURAN JR, MD             D: 2020   T: 2020   MT: FARHANA      Name:     ZOE LIMON   MRN:      [id number]        Account:        [id number]   :      1985           Procedure Date: 2020      Document: [id number]

## 2020-05-07 NOTE — DISCHARGE INSTRUCTIONS
Same Day Surgery Discharge Instructions for  Sedation and General Anesthesia       It's not unusual to feel dizzy, light-headed or faint for up to 24 hours after surgery or while taking pain medication.  If you have these symptoms: sit for a few minutes before standing and have someone assist you when you get up to walk or use the bathroom.      You should rest and relax for the next 24 hours. We recommend you make arrangements to have an adult stay with you for at least 24 hours after your discharge.  Avoid hazardous and strenuous activity.      DO NOT DRIVE any vehicle or operate mechanical equipment for 24 hours following the end of your surgery.  Even though you may feel normal, your reactions may be affected by the medication you have received.      Do not drink alcoholic beverages for 24 hours following surgery.       Slowly progress to your regular diet as you feel able. It's not unusual to feel nauseated and/or vomit after receiving anesthesia.  If you develop these symptoms, drink clear liquids (apple juice, ginger ale, broth, 7-up, etc. ) until you feel better.  If your nausea and vomiting persists for 24 hours, please notify your surgeon.        All narcotic pain medications, along with inactivity and anesthesia, can cause constipation. Drinking plenty of liquids and increasing fiber intake will help.      For any questions of a medical nature, call your surgeon.      Do not make important decisions for 24 hours.      If you had general anesthesia, you may have a sore throat for a couple of days related to the breathing tube used during surgery.  You may use Cepacol lozenges to help with this discomfort.  If it worsens or if you develop a fever, contact your surgeon.       If you feel your pain is not well managed with the pain medications prescribed by your surgeon, please contact your surgeon's office to let them know so they can address your concerns.       CoVid 19 Information    We want to give you  information regarding Covid. Please consult your primary care provider with any questions you might have.     Patient who have symptoms (cough, fever, or shortness of breath), need to isolate for 7 days from when symptoms started OR 72 hours after fever resolves (without fever reducing medications) AND improvement of respiratory symptoms (whichever is longer).      Isolate yourself at home (in own room/own bathroom if possible)    Do Not allow any visitors    Do Not go to work or school    Do Not go to [employer],  centers, shopping, or other public places.    Do Not shake hands.    Avoid close and intimate contact with others (hugging, kissing).    Follow CDC recommendations for household cleaning of frequently touched services.     After the initial 7 days, continue to isolate yourself from household members as much as possible. To continue decrease the risk of community spread and exposure, you and any members of your household should limit activities in public for 14 days after starting home isolation.     You can reference the following CDC link for helpful home isolation/care tips:  https://www.cdc.gov/coronavirus/2019-ncov/downloads/10Things.pdf    Protect Others:    Cover Your Mouth and Nose with a mask, disposable tissue or wash cloth to avoid spreading germs to others.    Wash your hands and face frequently with soap and water    Call Your Primary Doctor If: Breathing difficulty develops or you become worse.    For more information about COVID19 and options for caring for yourself at home, please visit the CDC website at https://www.cdc.gov/coronavirus/2019-ncov/about/steps-when-sick.html  For more options for care at [facility], please visit our website at [email or web address]    HOME CARE FOLLOWING LAPAROSCOPY  [facility]  [phone]      Diet  You have no restrictions on your diet.  During the evening following surgery, drink plenty of fluids and  eat a light supper.    Nausea  The anesthesia may produce some nausea.  If you feel nauseated try drinking fluids such as 7-Up, tea, or soup.     Discomfort  The amount of discomfort you can expect is very unpredictable.  If you have pain that cannot be controlled with Tylenol or with the prescription you may have received, you should notify your physician.  The following complaints are not uncommon and should not be cause for concern:  1. Abdominal tenderness; abdominal cramping.  2. Low backache or pain radiating to your shoulders, chest or back.  This is a result of the gas used to inflate your abdomen during surgery.  Lying flat in bed seems to help relieve this.   3. Sore throat for a day or two resulting from the anesthesia tube used during surgery.   4. Some bruising on your abdomen.     Drainage  You may expect a small amount of drainage from the incision on your abdomen and you may change the bandage when necessary.  You will also have a small amount of vaginal drainage for several days; this is normal and no cause for concern.  If excessive bleeding occurs, notify your physician.      If dye was used during your procedure, your urine will initially be bright blue. It will gradually return to yellow throughout the day. Drinking plenty of fluids will help to filter the dye from your urine.    Fever  A low grade fever (not over 101  Fahrenheit) is usual after this procedure.  Do not hesitate to notify your physician if your fever seems excessive.    Activity  Rest on the day of surgery then you may resume your normal activity, as tolerated. Avoid heavy lifting for one week.    You may shower.  Do not douche or use tampons.  If you also had a D&C, do not resume intercourse until bleeding has ceased.    Emergency Care  Contact your physician if you have any of these problems:   1.  A fever over 101  Fahrenheit   2.  A large amount of bleeding or drainage   3.  Severe pain      Today you received Toradol, an  antiinflammatory medication similar to Ibuprofen.  You should not take other antiinflammatory medication, such as Ibuprofen, Motrin, Advil, Aleve, Naprosyn, etc until 6:30pm.      Information for Patients Discharging with a Transderm Scopolamine Patch       Dry mouth is a common side effect.    Drowsiness is another common side effect especially when combined with pain medication.  Please avoid activities that require mental alertness such as driving a car or making important legal decisions.    Since Scopolamine can cause temporary dilation of the pupils and blurred vision if it comes in contact with the eyes; be sure to wash your hands thoroughly with soap and water immediately after handling the patch.   When you remove your patch, please stick it to a tissue or paper towel for disposal.      Remove the patch immediately and contact a physician in the unlikely event that you experience symptoms of acute glaucoma (pain and reddening of the eyes, accompanied by dilated pupils).    Remove the patch if you develop any difficulties urinating.  If you cannot urinate after removing your patch, please notify your surgeon.    Remove the patch 24 hours after surgery.      **If you have questions or concerns about your procedure,   call Dr. Nguyen at [phone]**

## 2020-05-07 NOTE — BRIEF OP NOTE
[facility] Brief Operative Note    Pre-operative diagnosis: Dysmenorrhea [N94.6]   Post-operative diagnosis ENDOMETRIOSIS   Procedure: LAPAROSCOPY, LASER LYSIS OF ADHESIONS, LASER VAPORIZATION OF ENDOMETRIOSIS   Surgeon(s): Surgeon(s) and Role:     * Luis M Nguyen MD - Primary   Estimated blood loss: 2 mL    Specimens: ID Type Source Tests Collected by Time Destination   1 : INTRAUTERINE DEVICE Other (specify in comments) Uterus OR DOCUMENTATION ONLY Luis M Nguyen MD 5/7/2020 12:26 PM       Findings: STAGE 2 ENDO, ADHESIONS

## 2020-05-07 NOTE — ANESTHESIA POSTPROCEDURE EVALUATION
Patient: Marifer Jenkins    Procedure(s):  LAPAROSCOPY, USING CO2 LASER  Remove intrauterine device    Diagnosis:Dysmenorrhea [N94.6]  Diagnosis Additional Information: No value filed.    Anesthesia Type:  General    Note:  Anesthesia Post Evaluation    Patient location during evaluation: PACU  Patient participation: Able to fully participate in evaluation  Level of consciousness: awake and alert  Pain management: adequate  Airway patency: patent  Cardiovascular status: acceptable and hemodynamically stable  Respiratory status: acceptable  Hydration status: euvolemic  PONV: none     Anesthetic complications: None          Last vitals:  Vitals:    05/07/20 1315 05/07/20 1330 05/07/20 1400   BP: 120/77 116/73 119/79   Pulse: 78 74 74   Resp: 15 17 16   Temp: 35.6  C (96.1  F) 35.8  C (96.4  F) 36  C (96.8  F)   SpO2: 98% 99% 100%         Electronically Signed By: Zaheer Zafar MD  May 7, 2020  5:39 PM

## 2020-05-07 NOTE — ANESTHESIA CARE TRANSFER NOTE
Patient: Marifer Jenkins    Procedure(s):  LAPAROSCOPY, USING CO2 LASER  Remove intrauterine device    Diagnosis: Dysmenorrhea [N94.6]  Diagnosis Additional Information: No value filed.    Anesthesia Type:   General     Note:  Airway :Face Mask  Patient transferred to:PACU  Handoff Report: Identifed the Patient, Identified the Reponsible Provider, Reviewed the pertinent medical history, Discussed the surgical course, Reviewed Intra-OP anesthesia mangement and issues during anesthesia, Set expectations for post-procedure period and Allowed opportunity for questions and acknowledgement of understanding      Vitals: (Last set prior to Anesthesia Care Transfer)    CRNA VITALS  5/7/2020 1234 - 5/7/2020 1310      5/7/2020             Pulse:  76    SpO2:  100 %    Resp Rate (set):  10                Electronically Signed By: MARKO Gaona CRNA  May 7, 2020  1:10 PM

## 2020-05-21 ENCOUNTER — OFFICE VISIT (OUTPATIENT)
Dept: OBGYN | Facility: CLINIC | Age: 35
End: 2020-05-21
Payer: COMMERCIAL

## 2020-05-21 DIAGNOSIS — Z09 POSTOP CHECK: ICD-10-CM

## 2020-05-21 DIAGNOSIS — E55.9 VITAMIN D DEFICIENCY: Primary | ICD-10-CM

## 2020-05-21 DIAGNOSIS — N96 RECURRENT MISCARRIAGES DUE TO LUTEAL PHASE DEFECT, NOT PREGNANT: ICD-10-CM

## 2020-05-21 PROCEDURE — 36415 COLL VENOUS BLD VENIPUNCTURE: CPT | Performed by: OBSTETRICS & GYNECOLOGY

## 2020-05-21 PROCEDURE — 99024 POSTOP FOLLOW-UP VISIT: CPT | Performed by: OBSTETRICS & GYNECOLOGY

## 2020-05-21 PROCEDURE — 82306 VITAMIN D 25 HYDROXY: CPT | Performed by: OBSTETRICS & GYNECOLOGY

## 2020-05-21 NOTE — PROGRESS NOTES
The patient is seen at this time for her postoperative check.  She had a laparoscopy with extensive posterior cul-de-sac surface and deep endometriosis.  She is healed very well.  She is desirous of pregnancy and has a history of multiple miscarriages.  She has had luteal phase defects in the past and been treated with progesterone.  She can move ahead with attempting pregnancy.  We want her to be on progesterone suppositories-25 mg per vagina daily 15-28.  She also has been taking vitamin D and we will draw her level today as she was deficient.

## 2020-05-26 LAB — DEPRECATED CALCIDIOL+CALCIFEROL SERPL-MC: 87 UG/L (ref 20–75)

## 2020-05-27 ENCOUNTER — TELEPHONE (OUTPATIENT)
Dept: OBGYN | Facility: CLINIC | Age: 35
End: 2020-05-27

## 2020-05-27 NOTE — TELEPHONE ENCOUNTER
Pt  Called and when she noticed her lab results on my chart for Vitamin D levels that an HCG and also blood type were done and she wanted to know why. She also stated that Marifer Kaur was supposed to send over an RX for her and wanted to know if that had been done? Would like a call back.

## 2020-05-27 NOTE — TELEPHONE ENCOUNTER
Answered patients questions.  Pt verbalized understanding, in agreement with plan, and voiced no further questions.  Ramona Mazariegos RN on 5/27/2020 at 9:57 AM

## 2020-05-27 NOTE — TELEPHONE ENCOUNTER
Fax received from [facility], [place] that compound drug progesterone 25 MG VA SUPP is not covered by insurance. Telephone call placed to patient to discuss. She is aware and will be paying cash. No Prior Authorization will be completed.

## 2020-09-05 ENCOUNTER — MYC MEDICAL ADVICE (OUTPATIENT)
Dept: OBGYN | Facility: CLINIC | Age: 35
End: 2020-09-05

## 2020-09-08 NOTE — TELEPHONE ENCOUNTER
Returned pt call and recommended an in office visit for continued sx's despite a dose of Diflucan.    Pt willing to schedule. Transferred to scheduling.    Maria Teresa Quintero RN on 9/8/2020 at 1:30 PM

## 2020-09-08 NOTE — TELEPHONE ENCOUNTER
Patient had [facility] appointment over the weekend, and was rx'd fluconazole, she states her symptoms are not improving.  Would like a call back, possible change in rx.  [facility], [address].

## 2020-09-09 ENCOUNTER — OFFICE VISIT (OUTPATIENT)
Dept: OBGYN | Facility: CLINIC | Age: 35
End: 2020-09-09
Payer: COMMERCIAL

## 2020-09-09 VITALS — WEIGHT: 248 LBS | BODY MASS INDEX: 41.27 KG/M2 | SYSTOLIC BLOOD PRESSURE: 110 MMHG | DIASTOLIC BLOOD PRESSURE: 80 MMHG

## 2020-09-09 DIAGNOSIS — N89.8 ITCHING OF VAGINA: Primary | ICD-10-CM

## 2020-09-09 DIAGNOSIS — R36.9 ABNORMAL PENILE DISCHARGE: ICD-10-CM

## 2020-09-09 DIAGNOSIS — B37.31 YEAST VAGINITIS: ICD-10-CM

## 2020-09-09 LAB
GRAM STN SPEC: NORMAL
Lab: NORMAL
SPECIMEN SOURCE: NORMAL

## 2020-09-09 PROCEDURE — 87205 SMEAR GRAM STAIN: CPT | Performed by: OBSTETRICS & GYNECOLOGY

## 2020-09-09 PROCEDURE — 87102 FUNGUS ISOLATION CULTURE: CPT | Performed by: OBSTETRICS & GYNECOLOGY

## 2020-09-09 PROCEDURE — 99213 OFFICE O/P EST LOW 20 MIN: CPT | Performed by: OBSTETRICS & GYNECOLOGY

## 2020-09-09 RX ORDER — FLUCONAZOLE 150 MG/1
150 TABLET ORAL ONCE
COMMUNITY
Start: 2020-09-06

## 2020-09-09 RX ORDER — FLUCONAZOLE 150 MG/1
150 TABLET ORAL DAILY
Qty: 2 TABLET | Refills: 1 | Status: SHIPPED | OUTPATIENT
Start: 2020-09-09 | End: 2020-09-11

## 2020-09-09 NOTE — PROGRESS NOTES
SUBJECTIVE:                                                   Marifer Jenkins is a 35 year old female who presents to clinic today for the following health issue(s):  Patient presents with:  Vaginal Problem: x1 week       Additional information: did a virtue visit and took antibiotic and pt feels symtoms has yet resided    HPI: The patient is seen at this time for possible yeast infection.  She has not had any recent antibiotics nor is she trapped moisture with a wet bathing suit.  She is taking progesterone to bring on cycles and testing for ovulation that is positive.  She is frustrated that she has not attained a pregnancy.      No LMP recorded..     Patient is sexually active, .  Using none for contraception.    reports that she has never smoked. She has never used smokeless tobacco.    STD testing offered?  Declined    Health maintenance updated:  no    Today's PHQ-2 Score:   PHQ-2 (  [employer]) 2020   Q1: Little interest or pleasure in doing things 0   Q2: Feeling down, depressed or hopeless 0   PHQ-2 Score 0     Today's PHQ-9 Score:   PHQ-9 SCORE 3/25/2020   PHQ-9 Total Score 3     Today's SHYANNE-7 Score:   SHYANNE-7 SCORE 3/25/2020   Total Score 2       Problem list and histories reviewed & adjusted, as indicated.  Additional history: as documented.    Patient Active Problem List   Diagnosis     Obesity due to excess calories     Morbid obesity (H)     Dysmenorrhea     Past Surgical History:   Procedure Laterality Date     CHOLECYSTECTOMY       EXC SKIN BENIG >4CM FACE,FACIAL  2011    benign mass from under chin removed     GALLBLADDER SURGERY  10/7/14     LASER CO2 LAPAROSCOPY DIAGNOSTIC, LYSIS ADHESIONS, COMBINED N/A 2020    Procedure: LAPAROSCOPY, USING CO2 LASER;  Surgeon: Luis M Nguyen MD;  Location:  OR     REMOVE INTRAUTERINE DEVICE N/A 2020    Procedure: Remove intrauterine device;  Surgeon: Luis M Nguyen MD;  Location:  OR      Social History     Tobacco Use     Smoking  status: Never Smoker     Smokeless tobacco: Never Used   Substance Use Topics     Alcohol use: Yes     Alcohol/week: 0.0 standard drinks     Comment: not while pregnant      Problem (# of Occurrences) Relation (Name,Age of Onset)    Alzheimer Disease (1) Maternal Grandfather    Arthritis (1) Maternal Grandmother    Cerebrovascular Disease (1) Maternal Grandfather    Diabetes (1) Paternal Grandmother    Heart Failure (1) Maternal Grandfather    Hyperlipidemia (6) Maternal Grandfather, Paternal Grandmother, Mother, Father, Maternal Grandmother, Paternal Grandfather    No Known Problems (3) Sister, Brother, Other       Negative family history of: Glaucoma, Macular Degeneration            Current Outpatient Medications   Medication Sig     cholecalciferol (VITAMIN D3) 35101 units (1250 mcg) capsule Take 50,000 capsules by mouth as needed Twice a week     progesterone 25 MG VA SUPP Place 1 suppository (25 mg) vaginally daily . Cycle days 15-28     No current facility-administered medications for this visit.      Allergies   Allergen Reactions     Topamax [Topiramate] Swelling     Dilaudid [Hydromorphone] Nausea and Vomiting     Xray Dye [Contrast Dye]        ROS:  12 point review of systems negative other than symptoms noted below or in the HPI.  Genitourinary: Vaginal Discharge and Vaginal Itching  No urinary frequency or dysuria, bladder or kidney problems      OBJECTIVE:     There were no vitals taken for this visit.  There is no height or weight on file to calculate BMI.    Exam:  Constitutional:  Appearance: Well nourished, well developed alert, in no acute distress  Skin: General Inspection:  No rashes present, no lesions present, no areas of discoloration.  Neurologic:  Mental Status:  Oriented X3.  Normal strength and tone, sensory exam grossly normal, mentation intact and speech normal.    Psychiatric:  Mentation appears normal and affect normal/bright.  Pelvic Exam:  External Genitalia:     Normal appearance  for age, no discharge present, no tenderness present, no inflammatory lesions present, color normal  Vagina: Obvious yeast infection   Normal vaginal vault without central or paravaginal defects, no discharge present, no inflammatory lesions present, no masses present  Bladder:     Nontender to palpation  Urethra:   Urethral Body:  Urethra palpation normal, urethra structural support normal   Urethral Meatus:  No erythema or lesions present  Cervix:     Appearance healthy, no lesions present, nontender to palpation, no bleeding present  Uterus:     Uterus: firm, normal sized and nontender, midplane in position.   Adnexa:     No adnexal tenderness present, no adnexal masses present  Perineum:     Perineum within normal limits, no evidence of trauma, no rashes or skin lesions present  Anus:     Anus within normal limits, no hemorrhoids present  Inguinal Lymph Nodes:     No lymphadenopathy present  Pubic Hair:     Normal pubic hair distribution for age  Genitalia and Groin:     No rashes present, no lesions present, no areas of discoloration, no masses present       In-Clinic Test Results: Vaginal culture pending    ASSESSMENT/PLAN:                                                        Impression-patient with probable yeast infection.  She did take 1 dose of Diflucan over the weekend.  She is somewhat better today.  We will treat her with 2 more nights of Diflucan.          Luis M Nguyen MD  [facility]

## 2020-09-14 LAB
Lab: ABNORMAL
SPECIMEN SOURCE: ABNORMAL
YEAST SPEC QL CULT: ABNORMAL

## 2020-12-13 ENCOUNTER — HEALTH MAINTENANCE LETTER (OUTPATIENT)
Age: 35
End: 2020-12-13

## 2021-08-11 ENCOUNTER — TRANSCRIBE ORDERS (OUTPATIENT)
Dept: MATERNAL FETAL MEDICINE | Facility: CLINIC | Age: 36
End: 2021-08-11

## 2021-08-11 ENCOUNTER — TRANSFERRED RECORDS (OUTPATIENT)
Dept: HEALTH INFORMATION MANAGEMENT | Facility: CLINIC | Age: 36
End: 2021-08-11

## 2021-08-11 ENCOUNTER — MEDICAL CORRESPONDENCE (OUTPATIENT)
Dept: HEALTH INFORMATION MANAGEMENT | Facility: CLINIC | Age: 36
End: 2021-08-11

## 2021-08-11 DIAGNOSIS — O26.90 PREGNANCY RELATED CONDITION, ANTEPARTUM: Primary | ICD-10-CM

## 2021-09-03 ENCOUNTER — PRE VISIT (OUTPATIENT)
Dept: MATERNAL FETAL MEDICINE | Facility: CLINIC | Age: 36
End: 2021-09-03

## 2021-09-08 ENCOUNTER — OFFICE VISIT (OUTPATIENT)
Dept: CARDIOLOGY | Facility: CLINIC | Age: 36
End: 2021-09-08
Payer: COMMERCIAL

## 2021-09-08 ENCOUNTER — HOSPITAL ENCOUNTER (OUTPATIENT)
Dept: CARDIOLOGY | Facility: CLINIC | Age: 36
Discharge: HOME | End: 2021-09-08
Admitting: OBSTETRICS & GYNECOLOGY
Payer: COMMERCIAL

## 2021-09-08 DIAGNOSIS — O26.90 PREGNANCY RELATED CONDITION, ANTEPARTUM: ICD-10-CM

## 2021-09-08 DIAGNOSIS — O35.BXX0 FETAL CARDIAC DISEASE AFFECTING PREGNANCY, SINGLE OR UNSPECIFIED FETUS: Primary | ICD-10-CM

## 2021-09-08 PROCEDURE — 93325 DOPPLER ECHO COLOR FLOW MAPG: CPT

## 2021-09-08 PROCEDURE — 99202 OFFICE O/P NEW SF 15 MIN: CPT | Mod: 25 | Performed by: PEDIATRICS

## 2021-09-08 PROCEDURE — 93325 DOPPLER ECHO COLOR FLOW MAPG: CPT | Mod: 26 | Performed by: PEDIATRICS

## 2021-09-08 PROCEDURE — 76827 ECHO EXAM OF FETAL HEART: CPT | Mod: 26 | Performed by: PEDIATRICS

## 2021-09-08 PROCEDURE — 76825 ECHO EXAM OF FETAL HEART: CPT | Mod: 26 | Performed by: PEDIATRICS

## 2021-09-08 NOTE — PROGRESS NOTES
[facility]   Heart Center Fetal Consult Note    Patient:  Marifer Jenkins MRN:  [id number]   YOB: 1985 Age:  36 year old   Date of Visit:  2021 PCP:  Tanvi Diez MD     Dear Doctor,     I had the pleasure of seeing Marifer Jenkins at the [facility] on 2021 in fetal cardiology consultation for fetal echocardiogram results. She presented today accompanied by her . As you know, she is a 36 year old  at 22w3d who presented for fetal echocardiogram today because of IVF pregnancy.    I performed and interpreted the fetal echocardiogram today, which demonstrated normal fetal cardiac anatomy. Normal fetal intracardiac connections. Normal right and left ventricular size and function. Fetal heart rate is regular at 152 bpm. No hydrops.     I reviewed the echo findings today with Marifer Jenkins. She is aware that the study was within normal limits with no major cardiac abnormalities. She is aware of the general limitations of fetal echocardiography. No additional fetal echocardiograms are recommended. No  cardiac follow-up is required.     Thank you for allowing me to participate in Marifer's care. Please do not hesitate to contact me with questions or concerns.    This visit was separate from the performance and interpretation of the ultrasound. The majority of the time (>50%) was spent in counseling and coordination of care. I spent approximately 15 minutes in face-to-face time reviewing the above considerations.    Cheryl Wilson M.D.  Pediatric Cardiology  [address], 5th floor, [facility] [address]  Phone [phone]  Fax [phone]

## 2021-09-26 ENCOUNTER — HEALTH MAINTENANCE LETTER (OUTPATIENT)
Age: 36
End: 2021-09-26

## 2022-01-16 ENCOUNTER — HEALTH MAINTENANCE LETTER (OUTPATIENT)
Age: 37
End: 2022-01-16

## 2022-06-06 ENCOUNTER — OFFICE VISIT (OUTPATIENT)
Dept: URGENT CARE | Facility: URGENT CARE | Age: 37
End: 2022-06-06
Payer: COMMERCIAL

## 2022-06-06 VITALS
HEART RATE: 95 BPM | BODY MASS INDEX: 42 KG/M2 | SYSTOLIC BLOOD PRESSURE: 137 MMHG | DIASTOLIC BLOOD PRESSURE: 88 MMHG | TEMPERATURE: 98.9 F | WEIGHT: 252.4 LBS | OXYGEN SATURATION: 98 %

## 2022-06-06 DIAGNOSIS — S61.211A LACERATION OF LEFT INDEX FINGER WITHOUT FOREIGN BODY WITHOUT DAMAGE TO NAIL, INITIAL ENCOUNTER: Primary | ICD-10-CM

## 2022-06-06 PROCEDURE — 99203 OFFICE O/P NEW LOW 30 MIN: CPT | Performed by: PHYSICIAN ASSISTANT

## 2022-06-06 ASSESSMENT — ENCOUNTER SYMPTOMS
SORE THROAT: 0
CHILLS: 0
EYES NEGATIVE: 1
WEAKNESS: 0
ARTHRALGIAS: 0
ALLERGIC/IMMUNOLOGIC NEGATIVE: 1
SHORTNESS OF BREATH: 0
NECK STIFFNESS: 0
RESPIRATORY NEGATIVE: 1
CARDIOVASCULAR NEGATIVE: 1
NECK PAIN: 0
MUSCULOSKELETAL NEGATIVE: 1
RHINORRHEA: 0
LIGHT-HEADEDNESS: 0
HEADACHES: 0
COUGH: 0
DIARRHEA: 0
JOINT SWELLING: 0
BACK PAIN: 0
VOMITING: 0
ENDOCRINE NEGATIVE: 1
DIZZINESS: 0
WOUND: 1
FEVER: 0
MYALGIAS: 0
NAUSEA: 0
PALPITATIONS: 0

## 2022-06-06 NOTE — PROGRESS NOTES
Chief Complaint:     Chief Complaint   Patient presents with     Laceration     LEFT POINTER FINGER          Assessment:     1. Laceration of left index finger without foreign body without damage to nail, initial encounter         Plan:    Imaging of the injured area for foreign body or fracture was not  Indicated    Wound was cleaned with sterile saline and surgiscrub.     Superficial wound closed with glue.     Discussed home wound care. Return to  with increased swelling, pain, redness, pus or fevers.    Patient was discharged in stable condition.  Patient verbalized understanding and agreed with this plan.        SUBJECTIVE     HPI: Marifer Jenkins is an 37 year old female that presents to clinic after cutting self on the L pointer finger with a scissors. She denies numbness of the finger. She denies dysfunction of the finger. Patient denies any loss of strength to the finger.  Patient is up to date on tetanus.    Patient is new to  [facility].     Review of Systems:  Review of Systems   Constitutional: Negative for chills and fever.   HENT: Negative for congestion, ear pain, rhinorrhea and sore throat.    Eyes: Negative.    Respiratory: Negative.  Negative for cough and shortness of breath.    Cardiovascular: Negative.  Negative for chest pain and palpitations.   Gastrointestinal: Negative for diarrhea, nausea and vomiting.   Endocrine: Negative.    Genitourinary: Negative.    Musculoskeletal: Negative.  Negative for arthralgias, back pain, joint swelling, myalgias, neck pain and neck stiffness.   Skin: Positive for wound. Negative for rash.   Allergic/Immunologic: Negative.  Negative for immunocompromised state.   Neurological: Negative for dizziness, weakness, light-headedness and headaches.         Family History   Family History   Problem Relation Age of Onset     Heart Failure Maternal Grandfather      Cerebrovascular Disease Maternal Grandfather      Alzheimer Disease Maternal Grandfather       Hyperlipidemia Maternal Grandfather      Diabetes Paternal Grandmother      Hyperlipidemia Paternal Grandmother      Hyperlipidemia Mother      Hyperlipidemia Father      Hyperlipidemia Maternal Grandmother      Arthritis Maternal Grandmother      Hyperlipidemia Paternal Grandfather      No Known Problems Sister      No Known Problems Brother      No Known Problems Other      Glaucoma No family hx of      Macular Degeneration No family hx of         Problem history  Patient Active Problem List   Diagnosis     Obesity due to excess calories     Morbid obesity (H)     Dysmenorrhea        Allergies  Allergies   Allergen Reactions     Topamax [Topiramate] Swelling     Dilaudid [Hydromorphone] Nausea and Vomiting     Xray Dye [Contrast Dye]         Social History  Social History     Socioeconomic History     Marital status:      Spouse name: Not on file     Number of children: Not on file     Years of education: Not on file     Highest education level: Not on file   Occupational History     Not on file   Tobacco Use     Smoking status: Never Smoker     Smokeless tobacco: Never Used   Substance and Sexual Activity     Alcohol use: Yes     Alcohol/week: 0.0 standard drinks     Comment: not while pregnant     Drug use: No     Sexual activity: Yes     Partners: Male     Birth control/protection: I.U.D.   Other Topics Concern     Parent/sibling w/ CABG, MI or angioplasty before 65F 55M? Not Asked   Social History Narrative     Not on file     Social Determinants of Health     Financial Resource Strain: Not on file   Food Insecurity: Not on file   Transportation Needs: Not on file   Physical Activity: Not on file   Stress: Not on file   Social Connections: Not on file   Intimate Partner Violence: Not on file   Housing Stability: Not on file        Current Meds    Current Outpatient Medications:      cholecalciferol (VITAMIN D3) 97081 units (1250 mcg) capsule, Take 50,000 capsules by mouth as needed Twice a week, Disp: ,  Rfl:      fluconazole (DIFLUCAN) 150 MG tablet, Take 150 mg by mouth once (Patient not taking: Reported on 6/6/2022), Disp: , Rfl:      progesterone 25 MG VA SUPP, Place 1 suppository (25 mg) vaginally daily . Cycle days 15-28 (Patient not taking: No sig reported), Disp: 14 suppository, Rfl: 4     sertraline (ZOLOFT) 50 MG tablet, , Disp: , Rfl:      OBJECTIVE    Vitals reviewed by Jamir Wyman PA-C  /88   Pulse 95   Temp 98.9  F (37.2  C) (Tympanic)   Wt 114.5 kg (252 lb 6.4 oz)   SpO2 98%   BMI 42.00 kg/m      Physical Exam:    Physical Exam  Vitals and nursing note reviewed.   Constitutional:       General: She is not in acute distress.     Appearance: She is well-developed. She is not ill-appearing, toxic-appearing or diaphoretic.   HENT:      Head: Normocephalic and atraumatic.      Right Ear: Tympanic membrane and external ear normal. No drainage, swelling or tenderness. Tympanic membrane is not perforated, erythematous, retracted or bulging.      Left Ear: Tympanic membrane and external ear normal. No drainage, swelling or tenderness. Tympanic membrane is not perforated, erythematous, retracted or bulging.      Nose: No mucosal edema, congestion or rhinorrhea.      Right Sinus: No maxillary sinus tenderness or frontal sinus tenderness.      Left Sinus: No maxillary sinus tenderness or frontal sinus tenderness.      Mouth/Throat:      Pharynx: No pharyngeal swelling, oropharyngeal exudate, posterior oropharyngeal erythema or uvula swelling.      Tonsils: No tonsillar abscesses.   Eyes:      Pupils: Pupils are equal, round, and reactive to light.   Neck:      Trachea: Trachea normal.   Cardiovascular:      Rate and Rhythm: Normal rate and regular rhythm.      Heart sounds: Normal heart sounds, S1 normal and S2 normal. No murmur heard.    No friction rub. No gallop.   Pulmonary:      Effort: Pulmonary effort is normal. No respiratory distress.      Breath sounds: Normal breath sounds. No decreased  breath sounds, wheezing, rhonchi or rales.   Abdominal:      General: Bowel sounds are normal. There is no distension.      Palpations: Abdomen is soft. Abdomen is not rigid. There is no mass.      Tenderness: There is no abdominal tenderness. There is no guarding or rebound.   Musculoskeletal:      Left hand: Laceration present. No swelling, deformity, tenderness or bony tenderness. Normal range of motion. Normal strength. Normal sensation. There is no disruption of two-point discrimination. Normal capillary refill. Normal pulse.        Hands:       Cervical back: Full passive range of motion without pain, normal range of motion and neck supple.      Comments: Superficial laceration of the L pointer finger with clean wound edges and no contamination.     Lymphadenopathy:      Cervical: No cervical adenopathy.   Skin:     General: Skin is warm and dry.   Neurological:      Mental Status: She is alert and oriented to person, place, and time.      Cranial Nerves: No cranial nerve deficit.      Deep Tendon Reflexes: Reflexes are normal and symmetric.   Psychiatric:         Behavior: Behavior normal. Behavior is cooperative.         Thought Content: Thought content normal.         Judgment: Judgment normal.         Jamir Wyman PA-C 11:34 AM

## 2023-04-23 ENCOUNTER — HEALTH MAINTENANCE LETTER (OUTPATIENT)
Age: 38
End: 2023-04-23

## 2024-06-30 ENCOUNTER — HEALTH MAINTENANCE LETTER (OUTPATIENT)
Age: 39
End: 2024-06-30

## (undated) DEVICE — EVAC SYSTEM CLEAR FLOW SC082500

## (undated) DEVICE — TUBING HYDRO-SURG PLUS LAP IRRIGATOR SUCTION 0026870

## (undated) DEVICE — ENDO SCOPE WARMER LF TM500

## (undated) DEVICE — GLOVE PROTEXIS MICRO 7.5  2D73PM75

## (undated) DEVICE — GLOVE PROTEXIS W/NEU-THERA 8.0  2D73TE80

## (undated) DEVICE — SYR 05ML SLIP TIP W/O NDL 309647

## (undated) DEVICE — LINEN TOWEL PACK X5 5464

## (undated) DEVICE — NDL 19GA 1.5"

## (undated) DEVICE — SU VICRYL 4-0 PS-2 18" UND J496H

## (undated) DEVICE — NDL INSUFFLATION 13GA 120MM C2201

## (undated) DEVICE — Device

## (undated) DEVICE — SOL RINGERS LACTATED 1000ML BAG 2B2324X

## (undated) DEVICE — SOL WATER IRRIG 1000ML BOTTLE 2F7114

## (undated) DEVICE — SYSTEM CLEARIFY VISUALIZATION 21-345

## (undated) DEVICE — ESU HOLDER LAP INST DISP PURPLE LONG 330MM H-PRO-330

## (undated) DEVICE — CATH TRAY FOLEY 16FR BARDEX W/DRAIN BAG STATLOCK 300316A

## (undated) DEVICE — PREP SKIN SCRUB TRAY 4461A

## (undated) RX ORDER — BUPIVACAINE HYDROCHLORIDE 2.5 MG/ML
INJECTION, SOLUTION EPIDURAL; INFILTRATION; INTRACAUDAL
Status: DISPENSED
Start: 2020-05-07

## (undated) RX ORDER — PROPOFOL 10 MG/ML
INJECTION, EMULSION INTRAVENOUS
Status: DISPENSED
Start: 2020-05-07

## (undated) RX ORDER — DEXAMETHASONE SODIUM PHOSPHATE 4 MG/ML
INJECTION, SOLUTION INTRA-ARTICULAR; INTRALESIONAL; INTRAMUSCULAR; INTRAVENOUS; SOFT TISSUE
Status: DISPENSED
Start: 2020-05-07

## (undated) RX ORDER — SCOLOPAMINE TRANSDERMAL SYSTEM 1 MG/1
PATCH, EXTENDED RELEASE TRANSDERMAL
Status: DISPENSED
Start: 2020-05-07

## (undated) RX ORDER — HEPARIN SODIUM 1000 [USP'U]/ML
INJECTION, SOLUTION INTRAVENOUS; SUBCUTANEOUS
Status: DISPENSED
Start: 2020-05-07

## (undated) RX ORDER — ONDANSETRON 2 MG/ML
INJECTION INTRAMUSCULAR; INTRAVENOUS
Status: DISPENSED
Start: 2020-05-07

## (undated) RX ORDER — OXYCODONE AND ACETAMINOPHEN 5; 325 MG/1; MG/1
TABLET ORAL
Status: DISPENSED
Start: 2020-05-07

## (undated) RX ORDER — FENTANYL CITRATE 50 UG/ML
INJECTION, SOLUTION INTRAMUSCULAR; INTRAVENOUS
Status: DISPENSED
Start: 2020-05-07

## (undated) RX ORDER — LIDOCAINE HYDROCHLORIDE 20 MG/ML
INJECTION, SOLUTION EPIDURAL; INFILTRATION; INTRACAUDAL; PERINEURAL
Status: DISPENSED
Start: 2020-05-07

## (undated) RX ORDER — GLYCOPYRROLATE 0.2 MG/ML
INJECTION, SOLUTION INTRAMUSCULAR; INTRAVENOUS
Status: DISPENSED
Start: 2020-05-07